# Patient Record
Sex: MALE | Race: WHITE | NOT HISPANIC OR LATINO | Employment: FULL TIME | URBAN - METROPOLITAN AREA
[De-identification: names, ages, dates, MRNs, and addresses within clinical notes are randomized per-mention and may not be internally consistent; named-entity substitution may affect disease eponyms.]

---

## 2023-04-24 ENCOUNTER — LAB (OUTPATIENT)
Dept: LAB | Facility: LAB | Age: 38
End: 2023-04-24
Payer: COMMERCIAL

## 2023-04-24 ENCOUNTER — OFFICE VISIT (OUTPATIENT)
Dept: PRIMARY CARE | Facility: CLINIC | Age: 38
End: 2023-04-24
Payer: COMMERCIAL

## 2023-04-24 VITALS
HEART RATE: 78 BPM | BODY MASS INDEX: 33.86 KG/M2 | SYSTOLIC BLOOD PRESSURE: 122 MMHG | WEIGHT: 250 LBS | DIASTOLIC BLOOD PRESSURE: 80 MMHG | HEIGHT: 72 IN | OXYGEN SATURATION: 96 %

## 2023-04-24 DIAGNOSIS — Z79.899 MEDICATION MANAGEMENT: ICD-10-CM

## 2023-04-24 DIAGNOSIS — E03.4 HYPOTHYROIDISM DUE TO ACQUIRED ATROPHY OF THYROID: ICD-10-CM

## 2023-04-24 DIAGNOSIS — M51.36 DDD (DEGENERATIVE DISC DISEASE), LUMBAR: ICD-10-CM

## 2023-04-24 DIAGNOSIS — E03.4 HYPOTHYROIDISM DUE TO ACQUIRED ATROPHY OF THYROID: Primary | ICD-10-CM

## 2023-04-24 PROBLEM — G47.33 OSA (OBSTRUCTIVE SLEEP APNEA): Status: ACTIVE | Noted: 2023-04-24

## 2023-04-24 PROBLEM — M54.2 NECK PAIN: Status: ACTIVE | Noted: 2023-04-24

## 2023-04-24 PROBLEM — M54.32 BACK PAIN WITH LEFT-SIDED SCIATICA: Status: ACTIVE | Noted: 2023-04-24

## 2023-04-24 PROBLEM — N52.9 ERECTILE DYSFUNCTION: Status: ACTIVE | Noted: 2023-04-24

## 2023-04-24 PROBLEM — M51.369 DDD (DEGENERATIVE DISC DISEASE), LUMBAR: Status: ACTIVE | Noted: 2023-04-24

## 2023-04-24 PROBLEM — D75.1 POLYCYTHEMIA: Status: ACTIVE | Noted: 2023-04-24

## 2023-04-24 PROBLEM — R93.89 ABNORMAL MRI: Status: ACTIVE | Noted: 2023-04-24

## 2023-04-24 LAB — THYROTROPIN (MIU/L) IN SER/PLAS BY DETECTION LIMIT <= 0.05 MIU/L: 3.3 MIU/L (ref 0.44–3.98)

## 2023-04-24 PROCEDURE — 83789 MASS SPECTROMETRY QUAL/QUAN: CPT

## 2023-04-24 PROCEDURE — 80307 DRUG TEST PRSMV CHEM ANLYZR: CPT

## 2023-04-24 PROCEDURE — 1036F TOBACCO NON-USER: CPT | Performed by: FAMILY MEDICINE

## 2023-04-24 PROCEDURE — 99214 OFFICE O/P EST MOD 30 MIN: CPT | Performed by: FAMILY MEDICINE

## 2023-04-24 PROCEDURE — 36415 COLL VENOUS BLD VENIPUNCTURE: CPT

## 2023-04-24 PROCEDURE — 84443 ASSAY THYROID STIM HORMONE: CPT

## 2023-04-24 RX ORDER — CARISOPRODOL 350 MG/1
350 TABLET ORAL 3 TIMES DAILY PRN
Qty: 60 TABLET | Refills: 0 | Status: SHIPPED | OUTPATIENT
Start: 2023-04-24 | End: 2023-08-03 | Stop reason: ALTCHOICE

## 2023-04-24 RX ORDER — CLOMIPHENE CITRATE 50 MG/1
TABLET ORAL
COMMUNITY
Start: 2020-12-02 | End: 2023-04-24 | Stop reason: ALTCHOICE

## 2023-04-24 RX ORDER — CHORIONIC GONADOTROPIN 10000 UNIT
KIT INTRAMUSCULAR
COMMUNITY
End: 2023-04-24 | Stop reason: ALTCHOICE

## 2023-04-24 RX ORDER — CARISOPRODOL 350 MG/1
1 TABLET ORAL 3 TIMES DAILY PRN
COMMUNITY
Start: 2018-06-22 | End: 2023-04-24 | Stop reason: SDUPTHER

## 2023-04-24 RX ORDER — LEVOTHYROXINE SODIUM 75 UG/1
1 TABLET ORAL DAILY
COMMUNITY
Start: 2021-03-04 | End: 2023-08-03 | Stop reason: ALTCHOICE

## 2023-04-24 RX ORDER — ANASTROZOLE 1 MG/1
TABLET ORAL
COMMUNITY
Start: 2020-12-02 | End: 2023-04-24 | Stop reason: ALTCHOICE

## 2023-04-24 ASSESSMENT — ENCOUNTER SYMPTOMS
UNEXPECTED WEIGHT CHANGE: 0
PALPITATIONS: 0
CONSTIPATION: 0
VOMITING: 0

## 2023-04-24 NOTE — PROGRESS NOTES
Subjective   Patient ID: Sha Melton is a 37 y.o. male who presents for Back Pain (Chronic 9 years/Ran out of thyroid medication never got the blood test done last time either ).    HPI   Thyroid well controlled.  No depression/anxiety, diarrhea/constipation, weight loss/gain, tremor, hair loss, heat/cold intolerance  Back hurts occasionally, only an issue when he is trying to sleep at night and will need a Soma rarely to be able to sleep, has lost weight and remains active  Review of Systems   Constitutional:  Negative for unexpected weight change.   HENT:  Negative for congestion and ear discharge.    Cardiovascular:  Negative for chest pain and palpitations.   Gastrointestinal:  Negative for constipation and vomiting.   All other systems reviewed and are negative.      Objective   /80   Pulse 78   Ht 1.829 m (6')   Wt 113 kg (250 lb)   SpO2 96%   BMI 33.91 kg/m²     Physical Exam  HENT:      Head: Normocephalic and atraumatic.      Nose: Nose normal.      Mouth/Throat:      Mouth: Mucous membranes are moist.      Pharynx: No oropharyngeal exudate.   Eyes:      Extraocular Movements: Extraocular movements intact.      Conjunctiva/sclera: Conjunctivae normal.      Pupils: Pupils are equal, round, and reactive to light.   Cardiovascular:      Rate and Rhythm: Normal rate and regular rhythm.   Pulmonary:      Effort: Pulmonary effort is normal.   Abdominal:      General: There is no distension.      Palpations: Abdomen is soft.   Musculoskeletal:      Cervical back: Normal range of motion and neck supple.   Lymphadenopathy:      Cervical: No cervical adenopathy.   Neurological:      General: No focal deficit present.      Mental Status: He is alert.   Psychiatric:         Attention and Perception: Attention normal.         Speech: Speech normal.         Behavior: Behavior is cooperative.     Normal gait, light touch and motor intact throughout    Assessment/Plan   Diagnoses and all orders for this  visit:  Hypothyroidism due to acquired atrophy of thyroid  Comments:  Recheck labs, compliance discussed  Orders:  -     Thyroid Stimulating Hormone; Future  DDD (degenerative disc disease), lumbar  Comments:  Home exercise regimen, stretching  Orders:  -     carisoprodol (Soma) 350 mg tablet; Take 1 tablet (350 mg) by mouth 3 times a day as needed for muscle spasms for up to 20 days.       Recheck 3 to 6 months sooner if any problems arise

## 2023-04-25 LAB
AMPHETAMINE (PRESENCE) IN URINE BY SCREEN METHOD: NORMAL
BARBITURATES PRESENCE IN URINE BY SCREEN METHOD: NORMAL
BENZODIAZEPINE (PRESENCE) IN URINE BY SCREEN METHOD: NORMAL
CANNABINOIDS IN URINE BY SCREEN METHOD: NORMAL
COCAINE (PRESENCE) IN URINE BY SCREEN METHOD: NORMAL
DRUG SCREEN COMMENT URINE: NORMAL
FENTANYL URINE: NORMAL
METHADONE (PRESENCE) IN URINE BY SCREEN METHOD: NORMAL
OPIATES (PRESENCE) IN URINE BY SCREEN METHOD: NORMAL
OXYCODONE (PRESENCE) IN URINE BY SCREEN METHOD: NORMAL
PHENCYCLIDINE (PRESENCE) IN URINE BY SCREEN METHOD: NORMAL

## 2023-04-27 ENCOUNTER — TELEPHONE (OUTPATIENT)
Dept: PRIMARY CARE | Facility: CLINIC | Age: 38
End: 2023-04-27
Payer: COMMERCIAL

## 2023-04-27 NOTE — TELEPHONE ENCOUNTER
Pt. Saw results on line. LM for him to call the office if he did feel he needed and adjustment in his medication.

## 2023-04-27 NOTE — TELEPHONE ENCOUNTER
----- Message from Antonio Richardson MD sent at 4/25/2023 11:46 AM EDT -----  Thyroid level is within normal range however the dose could be increased somewhat if you are having problems with fatigue or weight gain or cold intolerance or constipation or depression

## 2023-04-28 LAB
CARISOPRODOL, URINE: <100 NG/ML
Lab: <100 NG/ML

## 2023-07-19 ENCOUNTER — APPOINTMENT (OUTPATIENT)
Dept: LAB | Facility: LAB | Age: 38
End: 2023-07-19
Payer: COMMERCIAL

## 2023-07-19 LAB
ESTRADIOL (PG/ML) IN SER/PLAS: <19 PG/ML
FOLLITROPIN (IU/L) IN SER/PLAS: 1.3 IU/L
HEMATOCRIT (%) IN BLOOD BY AUTOMATED COUNT: 46.4 % (ref 41–52)
LUTEINIZING HORMONE (IU/ML) IN SER/PLAS: 0.8 IU/L
PROLACTIN (UG/L) IN SER/PLAS: 18 UG/L (ref 2–18)
PROSTATE SPECIFIC AG (NG/ML) IN SER/PLAS: 0.39 NG/ML (ref 0–4)
TESTOSTERONE (NG/DL) IN SER/PLAS: <60 NG/DL (ref 240–1000)

## 2023-07-22 ENCOUNTER — HOSPITAL ENCOUNTER (OUTPATIENT)
Dept: DATA CONVERSION | Facility: HOSPITAL | Age: 38
End: 2023-07-22
Attending: EMERGENCY MEDICINE

## 2023-07-26 LAB — 17-HYDROXYPROGESTERONE (REFLAB): 14.27 NG/DL

## 2023-07-27 LAB
ALANINE AMINOTRANSFERASE (SGPT) (U/L) IN SER/PLAS: 24 U/L (ref 10–52)
ALBUMIN (G/DL) IN SER/PLAS: 3.6 G/DL (ref 3.4–5)
ALKALINE PHOSPHATASE (U/L) IN SER/PLAS: 53 U/L (ref 33–120)
ANION GAP IN SER/PLAS: 10 MMOL/L (ref 10–20)
ASPARTATE AMINOTRANSFERASE (SGOT) (U/L) IN SER/PLAS: 17 U/L (ref 9–39)
BILIRUBIN TOTAL (MG/DL) IN SER/PLAS: 0.4 MG/DL (ref 0–1.2)
CALCIUM (MG/DL) IN SER/PLAS: 7.9 MG/DL (ref 8.6–10.3)
CARBON DIOXIDE, TOTAL (MMOL/L) IN SER/PLAS: 29 MMOL/L (ref 21–32)
CHLORIDE (MMOL/L) IN SER/PLAS: 103 MMOL/L (ref 98–107)
CREATININE (MG/DL) IN SER/PLAS: 0.9 MG/DL (ref 0.5–1.3)
ERYTHROCYTE DISTRIBUTION WIDTH (RATIO) BY AUTOMATED COUNT: 12.1 % (ref 11.5–14.5)
ERYTHROCYTE MEAN CORPUSCULAR HEMOGLOBIN CONCENTRATION (G/DL) BY AUTOMATED: 34.3 G/DL (ref 32–36)
ERYTHROCYTE MEAN CORPUSCULAR VOLUME (FL) BY AUTOMATED COUNT: 88 FL (ref 80–100)
ERYTHROCYTES (10*6/UL) IN BLOOD BY AUTOMATED COUNT: 4.85 X10E12/L (ref 4.5–5.9)
GFR MALE: >90 ML/MIN/1.73M2
GLUCOSE (MG/DL) IN SER/PLAS: 71 MG/DL (ref 74–99)
HEMATOCRIT (%) IN BLOOD BY AUTOMATED COUNT: 42.8 % (ref 41–52)
HEMOGLOBIN (G/DL) IN BLOOD: 14.7 G/DL (ref 13.5–17.5)
LEUKOCYTES (10*3/UL) IN BLOOD BY AUTOMATED COUNT: 4.5 X10E9/L (ref 4.4–11.3)
PLATELETS (10*3/UL) IN BLOOD AUTOMATED COUNT: 203 X10E9/L (ref 150–450)
POTASSIUM (MMOL/L) IN SER/PLAS: 4.4 MMOL/L (ref 3.5–5.3)
PROTEIN TOTAL: 6 G/DL (ref 6.4–8.2)
SODIUM (MMOL/L) IN SER/PLAS: 138 MMOL/L (ref 136–145)
UREA NITROGEN (MG/DL) IN SER/PLAS: 21 MG/DL (ref 6–23)

## 2023-08-03 ENCOUNTER — OFFICE VISIT (OUTPATIENT)
Dept: PRIMARY CARE | Facility: CLINIC | Age: 38
End: 2023-08-03
Payer: COMMERCIAL

## 2023-08-03 ENCOUNTER — APPOINTMENT (OUTPATIENT)
Dept: PRIMARY CARE | Facility: CLINIC | Age: 38
End: 2023-08-03
Payer: COMMERCIAL

## 2023-08-03 VITALS
DIASTOLIC BLOOD PRESSURE: 66 MMHG | BODY MASS INDEX: 33.91 KG/M2 | WEIGHT: 250 LBS | OXYGEN SATURATION: 94 % | SYSTOLIC BLOOD PRESSURE: 100 MMHG | HEART RATE: 81 BPM

## 2023-08-03 DIAGNOSIS — S32.009D CLOSED FRACTURE OF TRANSVERSE PROCESS OF LUMBAR VERTEBRA WITH ROUTINE HEALING, SUBSEQUENT ENCOUNTER: Primary | ICD-10-CM

## 2023-08-03 PROCEDURE — 99214 OFFICE O/P EST MOD 30 MIN: CPT | Performed by: FAMILY MEDICINE

## 2023-08-03 PROCEDURE — 1036F TOBACCO NON-USER: CPT | Performed by: FAMILY MEDICINE

## 2023-08-03 RX ORDER — ANASTROZOLE 1 MG/1
1 TABLET ORAL
COMMUNITY
Start: 2023-07-28

## 2023-08-03 RX ORDER — CLOMIPHENE CITRATE 50 MG/1
TABLET ORAL
COMMUNITY
Start: 2023-07-28

## 2023-08-03 RX ORDER — OXYCODONE HYDROCHLORIDE 5 MG/1
TABLET ORAL
COMMUNITY
Start: 2023-07-30 | End: 2023-08-03 | Stop reason: ALTCHOICE

## 2023-08-03 RX ORDER — OXYCODONE HYDROCHLORIDE 5 MG/1
10 TABLET ORAL 2 TIMES DAILY PRN
Qty: 28 TABLET | Refills: 0 | Status: SHIPPED | OUTPATIENT
Start: 2023-08-03 | End: 2023-08-10

## 2023-08-03 RX ORDER — IBUPROFEN 600 MG/1
TABLET ORAL
COMMUNITY
Start: 2023-07-21 | End: 2023-09-11 | Stop reason: ALTCHOICE

## 2023-08-03 RX ORDER — METHOCARBAMOL 500 MG/1
TABLET, FILM COATED ORAL
COMMUNITY
Start: 2023-07-30 | End: 2023-09-11 | Stop reason: ALTCHOICE

## 2023-08-03 ASSESSMENT — ENCOUNTER SYMPTOMS
CONSTIPATION: 0
UNEXPECTED WEIGHT CHANGE: 0
PALPITATIONS: 0
VOMITING: 0

## 2023-08-03 NOTE — PROGRESS NOTES
[place]  7/22-7/226 fx lumbar spine slipping on the steps and caught his back on the corner of the steps  To  Rehab 7/26-7/30  Can get around ok, but getting up and down hurts, recliner is comfortable, better than he was     Subjective   Patient ID: Sha Melton is a 37 y.o. male who presents for TCM.    HPI   c/o back pain, no numbness tingling weakness or change in bowel or bladder, no fever or chills, no blood in urine, no rash, better with rest  Nonsurgical and no brace recommended but was told that he would have to be off work 6 to 8 weeks     Review of Systems   Constitutional:  Negative for unexpected weight change.   HENT:  Negative for congestion and ear discharge.    Cardiovascular:  Negative for chest pain and palpitations.   Gastrointestinal:  Negative for constipation and vomiting.   All other systems reviewed and are negative.      Objective   /66   Pulse 81   Wt 113 kg (250 lb)   SpO2 94%   BMI 33.91 kg/m²     Physical Exam  HENT:      Head: Normocephalic and atraumatic.      Nose: Nose normal.      Mouth/Throat:      Mouth: Mucous membranes are moist.      Pharynx: No oropharyngeal exudate.   Eyes:      Extraocular Movements: Extraocular movements intact.      Conjunctiva/sclera: Conjunctivae normal.      Pupils: Pupils are equal, round, and reactive to light.   Cardiovascular:      Rate and Rhythm: Normal rate and regular rhythm.   Pulmonary:      Effort: Pulmonary effort is normal.   Abdominal:      General: There is no distension.      Palpations: Abdomen is soft.   Musculoskeletal:      Cervical back: Normal range of motion and neck supple.   Lymphadenopathy:      Cervical: No cervical adenopathy.   Neurological:      General: No focal deficit present.      Mental Status: He is alert.   Psychiatric:         Attention and Perception: Attention normal.         Speech: Speech normal.         Behavior: Behavior is cooperative.       Normal gait  Assessment/Plan   Diagnoses  and all orders for this visit:  Closed fracture of transverse process of lumbar vertebra with routine healing, subsequent encounter  -     oxyCODONE (Roxicodone) 5 mg immediate release tablet; Take 2 tablets (10 mg) by mouth 2 times a day as needed for moderate pain (4 - 6) or severe pain (7 - 10) for up to 7 days.    Continue with muscle relaxants, physical therapy, and rest  Return to work light duty given  RTC 1 month sooner if any problems arise

## 2023-09-08 PROBLEM — R21 RASH: Status: RESOLVED | Noted: 2023-09-08 | Resolved: 2023-09-08
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
## 2023-09-08 PROBLEM — N46.9 MALE INFERTILITY: Status: ACTIVE | Noted: 2023-09-08

## 2023-09-08 PROBLEM — S32.009A CLOSED FRACTURE OF LUMBAR VERTEBRA (MULTI): Status: RESOLVED | Noted: 2023-09-08 | Resolved: 2023-09-08

## 2023-09-08 PROBLEM — M54.9 DORSALGIA: Status: ACTIVE | Noted: 2023-09-08

## 2023-09-08 PROBLEM — S32.009A LUMBAR TRANSVERSE PROCESS FRACTURE (MULTI): Status: ACTIVE | Noted: 2023-09-08

## 2023-09-08 RX ORDER — TADALAFIL 5 MG/1
1 TABLET ORAL DAILY
COMMUNITY
Start: 2023-06-07

## 2023-09-08 RX ORDER — CHORIONIC GONADOTROPIN 10000 UNIT
KIT INTRAMUSCULAR
COMMUNITY
Start: 2023-08-09 | End: 2023-10-31 | Stop reason: DRUGHIGH

## 2023-09-08 RX ORDER — MELATONIN 10 MG
CAPSULE ORAL
COMMUNITY

## 2023-09-08 RX ORDER — OXYCODONE HYDROCHLORIDE 10 MG/1
TABLET ORAL
COMMUNITY
Start: 2023-07-25 | End: 2023-09-11 | Stop reason: ALTCHOICE

## 2023-09-08 RX ORDER — HYDROCODONE BITARTRATE AND ACETAMINOPHEN 5; 325 MG/1; MG/1
TABLET ORAL
COMMUNITY
Start: 2023-07-21 | End: 2023-09-11 | Stop reason: ALTCHOICE

## 2023-09-11 ENCOUNTER — OFFICE VISIT (OUTPATIENT)
Dept: PRIMARY CARE | Facility: CLINIC | Age: 38
End: 2023-09-11
Payer: COMMERCIAL

## 2023-09-11 VITALS
WEIGHT: 250 LBS | SYSTOLIC BLOOD PRESSURE: 110 MMHG | HEART RATE: 63 BPM | DIASTOLIC BLOOD PRESSURE: 76 MMHG | OXYGEN SATURATION: 95 % | BODY MASS INDEX: 33.91 KG/M2

## 2023-09-11 DIAGNOSIS — K02.9 PAIN DUE TO DENTAL CARIES: ICD-10-CM

## 2023-09-11 DIAGNOSIS — S32.009D CLOSED FRACTURE OF TRANSVERSE PROCESS OF LUMBAR VERTEBRA WITH ROUTINE HEALING, SUBSEQUENT ENCOUNTER: Primary | ICD-10-CM

## 2023-09-11 PROCEDURE — 1036F TOBACCO NON-USER: CPT | Performed by: FAMILY MEDICINE

## 2023-09-11 PROCEDURE — 99213 OFFICE O/P EST LOW 20 MIN: CPT | Performed by: FAMILY MEDICINE

## 2023-09-11 RX ORDER — AMOXICILLIN 500 MG/1
500 CAPSULE ORAL 2 TIMES DAILY
Qty: 14 CAPSULE | Refills: 0 | Status: SHIPPED | OUTPATIENT
Start: 2023-09-11 | End: 2023-09-18

## 2023-09-11 ASSESSMENT — ENCOUNTER SYMPTOMS
CONSTIPATION: 0
PALPITATIONS: 0
UNEXPECTED WEIGHT CHANGE: 0
VOMITING: 0

## 2023-09-11 NOTE — PROGRESS NOTES
Subjective   Patient ID: Sha Melton is a 37 y.o. male who presents for Letter for School/Work (Ready to go back has forms to complete/Needs note to resume medical massage /Gums are killing him and teeth are throbbing, wondering if you could treat until he sees a DDS ).    HPI   Back seems to have healed up well and he is looking forward to returning to full duty at the end of this week without any restrictions  No numbness tingling weakness no change in bowel or bladder no fever chills  Does have tooth pain which he says he thinks needs a root canal  Review of Systems   Constitutional:  Negative for unexpected weight change.   HENT:  Negative for congestion and ear discharge.    Cardiovascular:  Negative for chest pain and palpitations.   Gastrointestinal:  Negative for constipation and vomiting.   All other systems reviewed and are negative.      Objective   /76   Pulse 63   Wt 113 kg (250 lb)   SpO2 95%   BMI 33.91 kg/m²     Physical Exam  HENT:      Head: Normocephalic and atraumatic.      Nose: Nose normal.      Mouth/Throat:      Mouth: Mucous membranes are moist.      Pharynx: No oropharyngeal exudate.   Eyes:      Extraocular Movements: Extraocular movements intact.      Conjunctiva/sclera: Conjunctivae normal.      Pupils: Pupils are equal, round, and reactive to light.   Cardiovascular:      Rate and Rhythm: Normal rate and regular rhythm.   Pulmonary:      Effort: Pulmonary effort is normal.   Abdominal:      General: There is no distension.      Palpations: Abdomen is soft.   Musculoskeletal:      Cervical back: Normal range of motion and neck supple.   Lymphadenopathy:      Cervical: No cervical adenopathy.   Neurological:      General: No focal deficit present.      Mental Status: He is alert.   Psychiatric:         Attention and Perception: Attention normal.         Speech: Speech normal.         Behavior: Behavior is cooperative.         Assessment/Plan   Diagnoses and all orders for  this visit:  Closed fracture of transverse process of lumbar vertebra with routine healing, subsequent encounter  Comments:  Normal healing and cleared for return to work this Saturday no restrictions  Pain due to dental caries  Comments:  Antibiotic given and patient encouraged to see dentist and says he already has appointment  Orders:  -     amoxicillin (Amoxil) 500 mg capsule; Take 1 capsule (500 mg) by mouth 2 times a day for 7 days.    Recheck if any issues come up in the future otherwise 1 year

## 2023-10-16 ENCOUNTER — APPOINTMENT (OUTPATIENT)
Dept: ENDOCRINOLOGY | Facility: CLINIC | Age: 38
End: 2023-10-16
Payer: COMMERCIAL

## 2023-10-21 RX ORDER — LEVOTHYROXINE SODIUM 75 UG/1
75 TABLET ORAL DAILY
COMMUNITY

## 2023-10-21 RX ORDER — CARISOPRODOL 350 MG/1
350 TABLET ORAL 3 TIMES DAILY PRN
COMMUNITY

## 2023-10-24 ENCOUNTER — ANCILLARY PROCEDURE (OUTPATIENT)
Dept: ENDOCRINOLOGY | Facility: CLINIC | Age: 38
End: 2023-10-24
Payer: COMMERCIAL

## 2023-10-24 ENCOUNTER — LAB (OUTPATIENT)
Dept: LAB | Facility: LAB | Age: 38
End: 2023-10-24
Payer: COMMERCIAL

## 2023-10-24 DIAGNOSIS — N46.9 MALE INFERTILITY, UNSPECIFIED: Primary | ICD-10-CM

## 2023-10-24 DIAGNOSIS — N46.9 MALE INFERTILITY: ICD-10-CM

## 2023-10-24 LAB
% EX RESIDUAL CYTOPLASM (SEMEN): 1.3 %
% HEAD DEFECTS (SEMEN): 97 %
% NECK MIDPIECE (SEMEN): 45 %
% NORMAL (SEMEN): 1.5 % (ref 4–?)
% TAIL DEFECTS (SEMEN): 21.3 %
ABSTINENCE (DAYS): 5 DAYS (ref 2–7)
AGGLUTINATION (SEMEN): NO
ANALYZED TIME:: ABNORMAL
ANDROLOGY LAB ID#: ABNORMAL
CLUMPS (SEMEN): NO
COLLECTED COMPLETELY: YES
COLLECTION LOCATION:: ABNORMAL
COLLECTION METHOD:: ABNORMAL
CONCENTRATION(SEMEN): 12.54 MILL/ML (ref 15–?)
DEBRIS (SEMEN): YES
NON PROG. MOTILITY (SEMEN): 2 %
PROG. MOTILITY (SEMEN): 7 % (ref 32–?)
RECEIVED TIME:: ABNORMAL
SEMEN APPEARANCE: NORMAL
SEMEN LIQUEFACTION: NORMAL
SEMEN VISCOSITY: NORMAL
TOT. NO OF NORM. MOTILE SPERM (SEMEN): 0.83 MILL
TOT. NO OF NORM. SPERM (SEMEN): 0.07 MILL
TOTAL MOTILITY (SEMEN): 8 % (ref 40–?)
TOTAL NO OF MOTILE (SEMEN): 4.62 MILL
TOTAL NO OF SPERM (SEMEN): 55.17 MILL (ref 39–?)
VOLUME (SEMEN): 4.4 ML (ref 1.5–?)

## 2023-10-24 PROCEDURE — 36415 COLL VENOUS BLD VENIPUNCTURE: CPT

## 2023-10-24 PROCEDURE — 82670 ASSAY OF TOTAL ESTRADIOL: CPT

## 2023-10-24 PROCEDURE — 84403 ASSAY OF TOTAL TESTOSTERONE: CPT

## 2023-10-24 PROCEDURE — 84143 ASSAY OF 17-HYDROXYPREGNENO: CPT

## 2023-10-24 PROCEDURE — 89322 SEMEN ANAL STRICT CRITERIA: CPT | Performed by: OBSTETRICS & GYNECOLOGY

## 2023-10-24 PROCEDURE — 83001 ASSAY OF GONADOTROPIN (FSH): CPT

## 2023-10-24 PROCEDURE — 83002 ASSAY OF GONADOTROPIN (LH): CPT

## 2023-10-25 LAB
ESTRADIOL SERPL-MCNC: 26 PG/ML
FSH SERPL-ACNC: <0.9 IU/L
LH SERPL-ACNC: 0.1 IU/L
TESTOST SERPL-MCNC: 327 NG/DL (ref 240–1000)

## 2023-10-27 ENCOUNTER — TELEMEDICINE (OUTPATIENT)
Dept: UROLOGY | Facility: CLINIC | Age: 38
End: 2023-10-27
Payer: COMMERCIAL

## 2023-10-27 DIAGNOSIS — N52.9 ERECTILE DYSFUNCTION, UNSPECIFIED ERECTILE DYSFUNCTION TYPE: Primary | ICD-10-CM

## 2023-10-27 DIAGNOSIS — N46.9 INFERTILITY MALE: ICD-10-CM

## 2023-10-27 DIAGNOSIS — N46.9 MALE INFERTILITY: ICD-10-CM

## 2023-10-27 PROCEDURE — 99214 OFFICE O/P EST MOD 30 MIN: CPT | Performed by: UROLOGY

## 2023-10-27 NOTE — PROGRESS NOTES
Virtual or Telephone Consent    An interactive audio and video telecommunication system which permits real time communications between the patient (at the originating site) and provider (at the distant site) was utilized to provide this telehealth service.   Patient provided consent    Last visit 6/2023  -Advised stopping T use.   -Will likely reboot: HCG 3000 qod, Clomid 25 M-F, anastrozole 1 mg weekly.   -low T/fertility panel.   -SA.   -Restart Cialis 5 mg daily - med counselling done.     Today's visit:  Today's visit:   -had spine fracture  -has been off of everything since about summer    Partner/wife name: Mildred   Partner/wife age: 30    for: 1 year  Attempting Pregnancy for : 1 year   Previous pregnancies for either partner: Yes, son in 03/2022.   Wife's records obtained and reviewed yes, scanned in      -Currently taking anabolic steroids 250 mg weekly. Taking Anastrozole and Proviron. HCG pill form for 3 years   -Stopped taking Tadalafil.   -No breast enlargement, tenderness or pain.   Had one son last year (3/2022), [place], now interested in 2nd       PRIOR Assisted Reproductive Technology: IVF/ IUI  None       wife a nurse       Component      Latest Ref Rng 10/24/2023   [facility] Lab ID# [id number]    Abstinence (days)      2 - 7 days 5    Collected Completely Yes    Collection Location Center    Collection Method Masturbation    Received time 1:59 PM    Analyzed Time 2:19 PM    Semen Appearance Normal    Semen Viscosity Normal    Semen Liquefaction Normal    Debris (Semen) Yes    Clumps (Semen) No    Agglutination (Semen) No    Volume (Semen)      1.5 mL 4.4    Concentration(Semen)      15 mill/mL 12.54 !    Total Motility (Semen)      40 % 8 !    Prog. Motility (Semen)      32 % 7 !    Prog. Motility (Semen)      % 2    Total No of Sperm (Semen)      39 mill 55.17    Total No of Motile (Semen)      mill 4.62    % Normal (Semen)      4 % 1.5 !    % Head defects (Semen)      % 97.0     % Neck Midpiece (Semen)      % 45.0    % Tail defects (Semen)      % 21.3    % Ex Residual Cytoplasm (Semen)      % 1.3    Tot. No of Norm. Motile Sperm (Semen)      mill 0.828    Tot. No of Norm. Sperm (Semen)      mill 0.069    FOLLICLE STIMULATING HORMONE      IU/L <0.9    LH      IU/L 0.1    Estradiol      pg/mL 26    Testosterone      240 - 1,000 ng/dL 327       Legend:  ! Abnormal      PMH:  No past medical history on file.     PSH:  No past surgical history on file.     Medications:    Current Outpatient Medications:     anastrozole (Arimidex) 1 mg tablet, Take 1 tablet (1 mg total) by mouth 1 (one) time per week., Disp: , Rfl:     anastrozole (Arimidex) 1 mg tablet, TAKE ONE TABLET (1MG) BY MOUTH WEEKLY AS DIRECTED BY PROVIDER, Disp: 4 tablet, Rfl: 3    carisoprodol (Soma) 350 mg tablet, Take 1 tablet (350 mg) by mouth 3 times a day as needed., Disp: , Rfl:     chorionic gonadotropin (Pregnyl) 10,000 unit injection, Inject 3,000 units under the skin every other day as directed per provider., Disp: , Rfl:     chorionic gonadotropin (Pregnyl) 10,000 unit injection, INJECT 3,000 UNITS UNDER THE SKIN EVERY OTHER DAY AS DIRECTED PER PROVIDER., Disp: 5 each, Rfl: 3    Clomid 50 mg tablet, TAKE 1/2 TABLET BY MOUTH MONDAY THROUGH FRIDAY, Disp: , Rfl:     clomiPHENE (Clomid) 50 mg tablet, TAKE 25MG (1/2 TABLET) BY MOUTH ONCE DAILY MONDAY- FRIDAY AS DIRECTED BY PROVIDER., Disp: 10 tablet, Rfl: 5    levothyroxine (Synthroid, Levoxyl) 75 mcg tablet, Take 1 tablet (75 mcg) by mouth once daily., Disp: , Rfl:     melatonin 10 mg capsule, 1 cap(s) orally once a day (at bedtime), Disp: , Rfl:     tadalafil (Cialis) 5 mg tablet, Take 1 tablet (5 mg) by mouth once daily., Disp: , Rfl:     Allergy:  No Known Allergies     Exam  CONSTITUTIONAL:        No acute distress    HEAD:        Normocephalic and atraumatic    CHEST / RESPIRATORY      no excess work of breathing, no respiratory distress,    ABDOMEN / GASTROINTESTINAL:         Abdomen nondistended        Assessment/Plan  #infertility  -Will start reboot: HCG 1500 weekly, Clomid 25 M-F, anastrozole 1 mg weekly (will send to  specialty)       #ED  -Cialis 5 mg daily     Fu in 3 months with fert fu labs

## 2023-10-31 ENCOUNTER — PHARMACY VISIT (OUTPATIENT)
Dept: PHARMACY | Facility: CLINIC | Age: 38
End: 2023-10-31
Payer: COMMERCIAL

## 2023-10-31 ENCOUNTER — TELEMEDICINE CLINICAL SUPPORT (OUTPATIENT)
Dept: PHARMACY | Facility: HOSPITAL | Age: 38
End: 2023-10-31
Payer: COMMERCIAL

## 2023-10-31 DIAGNOSIS — N46.9 MALE INFERTILITY: Primary | ICD-10-CM

## 2023-10-31 PROCEDURE — RXMED WILLOW AMBULATORY MEDICATION CHARGE

## 2023-10-31 RX ORDER — CHORIONIC GONADOTROPIN 10000 UNIT
1500 KIT INTRAMUSCULAR
Qty: 5 EACH | Refills: 3 | Status: SHIPPED | OUTPATIENT
Start: 2023-10-31 | End: 2024-02-16

## 2023-10-31 NOTE — PROGRESS NOTES
New referral received for male fertility. Called and spoke with patient- first fill education for Pregnyl/HCG 10,000 unit vials completed. All medications in referral sent in for Dr. AUGUST per protocol. Per caregiver, receiving medications (Clomid and Anastrozole from retail pharmacy, only HCG needed from [facility]).    ---- Message -----   From: Viktoriya Barber RN   Sent: 10/27/2023   3:53 PM EDT   To: Hue Hook, PharmD   Subject: medication orders                                Patient seen by Dr. Mims on 10/27 The patient's care will be continued with the referring prescriber.     Pharmacist to order and educate the patient on the following medication: Chorionic gonadotropin (Pregnyl) 1500 iu  subcutaneous weekly disp: 5 with 3 refills. Clomid 50mg take 1/2 tablet by mouth Monday through Friday. Disp: 10 tablets 3 refills. Anastrozole 1mg by mouth once weekly. Disp: 10 with 3 refills     [facility]  Patient Management Program- First Fill Assessment:  Pregnyl / Chorionic Human Gonadotropin (HCG)     Clinical Intervention: Pregnyl /hCG First Fill Assessment/New Start Patient Education    Medication receipt date: 11/1/23 Fertility indication: Male Infertility    Planned Initiation Date: 11/1/23    Date of education: 10/31/2023  Please see details below. Patient was educated on the administration, warnings, precautions, common adverse effects, proper storage, handling, and disposal.   Patient was instructed to contact the Fertility Clinical Pharmacy Specialist or Support Liaison with any clinical or billing inquiries, as well as refill requests.   A copy of the  Specialty Pharmacy's /delivery protocol has been emailed to the patient for their reference.   We discussed all medications being used for this patient's treatment plan and reviewed patient specific goals.   Follow up needed: If Applicable    Next refill date: 30 days  Reassessment date: 3 months  The patient's  care will be continued with the referring provider.  Patient Discussion:     Introduction:   - Patient's wife contacted  via telephone  to conduct first fill assessment and new start patient education on Pregnyl /hCG.  - Verified receipt of Pregnyl /hCG from [facility], along with the pharmacy supplied Welcome Kit, sharps container, required supplies, and patient education monograph. The contents of the Welcome Kit were reviewed with the patient.      Clinical Background:  - The patient's medication list, allergies, and comorbid conditions were verified. No contraindications to therapy noted at this time. (Contraindications: hypersensitivity, tumors of the hypothalamus, pituitary, breast, prostate, and uncontrolled non-gonadal endocrinopathies- thyroid, adrenal, pituitary disorders.  - Patient advised to contact the pharmacy if there are any changes to medication list, including prescriptions, OTC medications, herbal products, or supplements.   - There are no known significant drug-drug interactions.    - Labs were reviewed and no concerns were noted regarding the patient's therapy at this time.  -Medication is clinically appropriate.      Education/Discussion:  Patient accepted the pharmacist's offer of counseling.    Indication: Pregnyl/hCG stimulates production of gonadal steroid hormones by causing production of androgen by the testes and the development of secondary sex characteristics in males. It stimulates the interstitial cells (Leydig cells) of the testis to produce androgens.     Dose:   Inject 1,500 units under the skin once weekly.    Administration:   SubQ: Reconstitute according to instructions and inject in the lower abdomen (avoiding areas within 2 inches of navel) using a 22 gauge 1½”-  use to mix HCG, 27 gauge ½”- use to inject HCG, 3 ml syringe- use to mix and inject HCG. Rotate injection sites.     Does the patient have any barriers to self-administration (including  physical and mental)? No     List barriers: n/a     Describe actions taken to mitigate barriers: n/a     Patient/caregiver counseled on proper technique for self-administration: Yes    Missed Doses:  Importance of adherence discussed with patient and they were advised to use a calendar to keep track of doses. If a dose is missed, the patient should contact  Department of Male Reproductive and Sexual Health for further dosing instructions.     What barriers to adherence does the patient have? (answer Y/N for all):   Patient has a difficult time remembering to take medications? No  Difficult administration technique? No  Medication cost? No  Patient does not think medication is beneficial? No  Other?   What actions were taken to address barriers?    Warnings, Precautions, and Adverse Reactions:   - Discussed common adverse effects, warnings and precautions pertinent to the medication including but not limited to (frequency not defined): edema, gynecomastia, headache, fatigue, irritability, and restlessness. Some patients may experience injection site reactions, such as pain, swelling, inflammation, or bruising.   - Since androgens may cause fluid retention, HCG should be used with caution in patients with cardiac, renal disease, epilepsy, migraine, or asthma.  - Patient was encouraged to reach out to their doctor's office if they develop:   -Signs of an allergic reaction   - Experienced specialists: should only be prescribed by male fertility specialists for this indication.      Handling and Storage   Store intact vials at room temperature (59°F to 86°F). Following reconstitution, solution is stable when refrigerated (36ºF to 46ºF) for 60 days (Pregnyl). Do not freeze.    Reproductive Considerations   When administered for spermatogenesis induction associated with hypogonadotropic hypogonadism in patients planning a pregnancy, the fertility status of both partners should be evaluated prior to therapy.      Disposal:  Unused,  or damaged vials should be properly disposed in sharps container.  Needles and syringes should be disposed of in sharps container once used.  Do not discard in trash.     Goals of therapy:  *Goals of therapy are patient specific   Improve sperm quality, quantity, and motility.   Improve level of hormones from male genital organs.   Ensure adequate patient education and adherence to medications.  Optimize treatment options based on patient-specific factors, including co-morbidities and past infertility complications.   Decrease risk and manage side effects from this medication.  Establish pregnancy.      Patient's Goals: Ultimate goal of achieving a viable pregnancy with their partner.    Efficacy timeline:   While immediate improvement may be noted, the patient should expect to wait 3-6 months to see full benefit from therapy.  Every person responds and reacts to therapy differently. Take as directed by your provider.  Adverse effects or efficacy to treatment can occur at any point during therapy.  Recommended contacting the  Department of Male Reproductive and Sexual Health after starting therapy and sooner if symptoms worsen or new symptoms develop.     Conclusion:  - Quality of life: did not discuss  - Pharmacy Satisfaction: did not discuss  - High risk status: No  - Is clinical intervention necessary? No  - If yes, what additional action was taken? Emailed Melina instructions, dose change from 3000 units every other day    Patient was advised of [facility]'s dispensing process, refill timeline, contact information ([phone]), and patient management follow up. Patient confirmed understanding of education conducted during assessment. All patient questions and concerns were addressed to the best of my ability. Patient was encouraged to contact the Fertility Clinical Pharmacy Specialist, Pharmacy Support Liaison, or the general line for the specialty  pharmacy if unable to reach one of the contacts for the Fertility service line (listed above) with any questions or concerns.      Human Chorionic Gonadotropin- Subcutaneous Injection  (10,000 IU Pregnyl, HCG)  Dose: 1,500 IU once weekly    Storage: Store at room temperature until reconstituted- following reconstitution, solution is stable when refrigerated.  Supplies: 3 mL syringe and 22 gauge 1½ inch needle to mix HCG, 1 mL syringe and 27 gauge ½ inch needle to inject HCG, alcohol swabs, sharps container.  Instructions:     Wash your hands and work on a clean, dry surface.    Attach a 22 gauge 1 ½ inch needle to a 3 mL syringe to mix the diluent into HCG powder.  Remove the protective caps from both vials. Clean the tops of both vials with an alcohol swab.   Pull back on the plunger to draw up 1 mL of air into the syringe.   Insert the needle into the vial of bacteriostatic water and push the 1 mL of air into the vial.   Turn the vial upside down with the needle still in the vial. Adjust the needle so that the tip of the needle is in the water, not the air space. Slowly pull back on the plunger to draw up 1 mL of bacteriostatic water.   Remove the needle from the vial of bacteriostatic water. You are only using 1 mL, so there will be a large amount left in the vial. You will not need this extra bacteriostatic water left in the vial, it can be discarded.   Inject the bacteriostatic water in the syringe into the vial of HCG powder.   Swirl the vial gently until all of the powder is dissolved.   Using the 1 mL syringe and 27 gauge ½ inch needle- you will now draw up 0.15 mL of the mixed medication in the vial for each dose (0.15 mL = 1,500 units).   Administration:   Choose an injection site on your abdomen (see image below).   Clean the area with an alcohol swab and allow to dry.   Pinch up the skin.   Insert the needle straight into the skin- do not go in at an angle.   Push the plunger all the way down, then wait  5 seconds before removing the needle from the skin.           Hue Hook (Katie), PharmD  [facility]  Clinical Pharmacy Specialist- Fertility   [facility]  [address] [place] [address]  Email: [email or web address]  Tel: [phone]       Fax: [phone]

## 2023-10-31 NOTE — PROGRESS NOTES
[facility] SPECIALTY PHARMACY PATIENT REASSESSMENT NOTE    [facility] SUPPLIED MEDICATION:      Pregnyl/HCG 10,000 unit vials    The patient's care will be continued with the referring prescriber.     TOLERANCE:   Have you experienced any side effects from this medication? No    Are there any changes to current therapy regimen? Yes changing to 1,500 weekly- new rx to follow     EFFICACY:    Have you developed any new symptoms of your condition? No    How do you feel your medication is affecting your disease state? N/a    GOALS:  Your goals of therapy are:  Ultimate goal of achieving a viable pregnancy with their partner.    COMPLIANCE / ADHERENCE:  Have you had any unplanned missed doses? No  If yes, how often do you miss doses and is there a particular contributing reason?     What barriers to adherence does the patient have? (Answer Y/N for all):  Patient has a difficult time remembering to take medications?  No  Difficult administration technique? No  Medication cost? No  Patient does not think medication is beneficial? No  Other?   What actions were taken to address barriers?     Does the patient have any barriers to self-administration (including physical and mental)? No  List barriers:   Describe actions taken to mitigate barriers:    GENERAL ASSESSMENT:  Are there any changes to your home medications, OTCs or supplements? No    Do you have any new allergies? No     Have you been diagnosed with any new medical conditions? No    PATIENT MANAGEMENT:    Do you have any questions regarding your medications, or care? No    Do you have any concerns with access to your medications? No    How would you classify your Quality of Life on a scale of 1-10? Did not discuss    How would you describe your satisfaction with  Specialty Pharmacy services on a scale of 1-10?  Did not discuss  Do you have any additional suggestions to improve  Specialty Pharmacy services:     IMPRESSION/PLAN:  Is patient high risk? No    Is  laboratory follow-up needed? No    Is a clinical intervention needed? No    Next reassessment date? 3 months    Additional comments: Completed with wife, Melina Hook (Katie), PharmEMMIE  [facility]  Clinical Pharmacy Specialist- Fertility   [facility], [facility]  [address] [place] [address]  Email: [email or web address]  Tel: [phone]       Fax: [phone]

## 2023-10-31 NOTE — PROGRESS NOTES
New patient referral for Male Fertility- Pharmacy Telehealth  Medication sent into  Specialty Pharmacy following referral from Dr. Mims        Patient is being seen by Dr. Mims for the following:      Treatment plan:   Assessment/Plan  #infertility  -Will start reboot: HCG 1500 weekly, Clomid 25 M-F, anastrozole 1 mg weekly (will send to  specialty)     The following medications were sent into  Specialty Pharmacy on 10/31/23 for the above treatment:      Pregnyl/HCG 10,000 unit vials      Hue Hook (Katie), PharmEMMIE  [facility] Special[facility] Pharm[facility]  Clinical Pharmacy Specialist- Fertility   [facility], [facility]  [address] [place] [address]  Email: [email or web address]  Tel: [phone]       Fax: [phone]

## 2023-11-01 LAB — 17OH-PREG SERPL-MCNC: 265 NG/DL

## 2024-02-16 ENCOUNTER — TELEMEDICINE CLINICAL SUPPORT (OUTPATIENT)
Dept: PHARMACY | Facility: HOSPITAL | Age: 39
End: 2024-02-16
Payer: COMMERCIAL

## 2024-02-16 NOTE — PROGRESS NOTES
Unable to reach patient - 3 attempts     Specific Medication Education: Male Fertility, on Clomid, Anastrozole, HCG 1,500 weekly     Date of outreach: 2/16/24 (final attempt), 3 attempts made and Shaser message sent for reassessment, refills, adherence check in    PRIMARY CONTACT- Wife Melina  Phone: [phone]  Email: [email or web address]    Discontinued all medications until point of contact with patient or wife. Therapy started Oct 2023 and follow up was supposed to be 3 months later with Dr. Mims.     Hue Hook (Katie), PharmEMMIE  [facility]  Clinical Pharmacy Specialist- Fertility   [facility]  [address] [place] [address]  Email: [email or web address]  Tel: [phone]       Fax: [phone]

## 2024-10-23 DIAGNOSIS — N46.9 INFERTILITY MALE: ICD-10-CM

## 2024-12-14 ENCOUNTER — APPOINTMENT (OUTPATIENT)
Dept: PRIMARY CARE | Facility: CLINIC | Age: 39
End: 2024-12-14
Payer: COMMERCIAL

## 2025-01-20 ENCOUNTER — APPOINTMENT (OUTPATIENT)
Dept: PRIMARY CARE | Facility: CLINIC | Age: 40
End: 2025-01-20
Payer: COMMERCIAL

## 2025-01-20 VITALS
BODY MASS INDEX: 35.57 KG/M2 | SYSTOLIC BLOOD PRESSURE: 104 MMHG | HEART RATE: 77 BPM | OXYGEN SATURATION: 96 % | WEIGHT: 255 LBS | DIASTOLIC BLOOD PRESSURE: 72 MMHG

## 2025-01-20 DIAGNOSIS — E03.4 HYPOTHYROIDISM DUE TO ACQUIRED ATROPHY OF THYROID: ICD-10-CM

## 2025-01-20 DIAGNOSIS — M51.361 DEGENERATION OF INTERVERTEBRAL DISC OF LUMBAR REGION WITH LOWER EXTREMITY PAIN: ICD-10-CM

## 2025-01-20 DIAGNOSIS — L72.0 EIC (EPIDERMAL INCLUSION CYST): Primary | ICD-10-CM

## 2025-01-20 DIAGNOSIS — Z79.899 MEDICATION MANAGEMENT: ICD-10-CM

## 2025-01-20 PROBLEM — S32.009A LUMBAR TRANSVERSE PROCESS FRACTURE (MULTI): Status: RESOLVED | Noted: 2023-09-08 | Resolved: 2025-01-20

## 2025-01-20 PROCEDURE — 83789 MASS SPECTROMETRY QUAL/QUAN: CPT

## 2025-01-20 PROCEDURE — 99214 OFFICE O/P EST MOD 30 MIN: CPT | Performed by: FAMILY MEDICINE

## 2025-01-20 PROCEDURE — 80307 DRUG TEST PRSMV CHEM ANLYZR: CPT

## 2025-01-20 PROCEDURE — 1036F TOBACCO NON-USER: CPT | Performed by: FAMILY MEDICINE

## 2025-01-20 RX ORDER — CARISOPRODOL 350 MG/1
350 TABLET ORAL 3 TIMES DAILY PRN
Qty: 60 TABLET | Refills: 0 | Status: SHIPPED | OUTPATIENT
Start: 2025-01-20

## 2025-01-20 ASSESSMENT — PATIENT HEALTH QUESTIONNAIRE - PHQ9
SUM OF ALL RESPONSES TO PHQ9 QUESTIONS 1 AND 2: 0
1. LITTLE INTEREST OR PLEASURE IN DOING THINGS: NOT AT ALL
2. FEELING DOWN, DEPRESSED OR HOPELESS: NOT AT ALL

## 2025-01-20 ASSESSMENT — ENCOUNTER SYMPTOMS
UNEXPECTED WEIGHT CHANGE: 0
BACK PAIN: 1
VOMITING: 0
CONSTIPATION: 0
PALPITATIONS: 0

## 2025-01-20 NOTE — PROGRESS NOTES
Subjective   Patient ID: Sha Melton is a 39 y.o. male who presents for Back Pain (Pt would like thyroid levels checked. /Chronic back pain, gets tight and mostly achy.  Pt did hurt his back 2 weeks ago getting up off the floor.  Pt has been taking ibuprofen to help).    Back Pain  Pertinent negatives include no chest pain.    c/o back pain, no numbness tingling weakness or change in bowel or bladder, no fever or chills, no blood in urine, no rash, no trauma, better with rest      Review of Systems   Constitutional:  Negative for unexpected weight change.   HENT:  Negative for congestion and ear discharge.    Cardiovascular:  Negative for chest pain and palpitations.   Gastrointestinal:  Negative for constipation and vomiting.   Musculoskeletal:  Positive for back pain.   All other systems reviewed and are negative.      Objective   /72   Pulse 77   Wt 116 kg (255 lb)   SpO2 96%   BMI 35.57 kg/m²     Physical Exam  HENT:      Head: Normocephalic and atraumatic.      Nose: Nose normal.      Mouth/Throat:      Mouth: Mucous membranes are moist.      Pharynx: No oropharyngeal exudate.   Eyes:      Extraocular Movements: Extraocular movements intact.      Conjunctiva/sclera: Conjunctivae normal.      Pupils: Pupils are equal, round, and reactive to light.   Cardiovascular:      Rate and Rhythm: Normal rate and regular rhythm.   Pulmonary:      Effort: Pulmonary effort is normal.      Breath sounds: Normal breath sounds.   Abdominal:      General: There is no distension.      Palpations: Abdomen is soft.   Musculoskeletal:      Cervical back: Normal range of motion and neck supple.   Lymphadenopathy:      Cervical: No cervical adenopathy.   Neurological:      General: No focal deficit present.      Mental Status: He is alert.   Psychiatric:         Attention and Perception: Attention normal.         Speech: Speech normal.         Behavior: Behavior is cooperative.     Normal gait  Neuro sensorimotor  intact    Assessment/Plan   Problem List Items Addressed This Visit             ICD-10-CM    DDD (degenerative disc disease), lumbar M51.369     Risk-benefit discussed and refill medication as directed  I have personally reviewed the OARRS report for this patient. I have considered the risks of abuse, dependence, addiction and diversion.           Relevant Medications    carisoprodol (Soma) 350 mg tablet    Hypothyroidism due to acquired atrophy of thyroid E03.4     Monitor  Patient has come off of his thyroid medication         Relevant Orders    Thyroid Stimulating Hormone    Thyroxine, Free     Other Visit Diagnoses         Codes    EIC (epidermal inclusion cyst)    -  Primary L72.0    Relevant Orders    Referral to General Surgery    Medication management     Z79.899    Relevant Orders    Meprobamate Urine Confirmation    Drug Screen, Urine With Reflex to Confirmation         LM discussed  Reviewed labs  Recheck 6 to 12 months sooner if any issues arise

## 2025-01-20 NOTE — ASSESSMENT & PLAN NOTE
Monitor  Patient has come off of his thyroid medication  
Risk-benefit discussed and refill medication as directed  I have personally reviewed the OARRS report for this patient. I have considered the risks of abuse, dependence, addiction and diversion.    
diminished breath sounds, R

## 2025-01-21 LAB
AMPHETAMINES UR QL SCN: NORMAL
BARBITURATES UR QL SCN: NORMAL
BENZODIAZ UR QL SCN: NORMAL
BZE UR QL SCN: NORMAL
CANNABINOIDS UR QL SCN: NORMAL
FENTANYL+NORFENTANYL UR QL SCN: NORMAL
METHADONE UR QL SCN: NORMAL
OPIATES UR QL SCN: NORMAL
OXYCODONE+OXYMORPHONE UR QL SCN: NORMAL
PCP UR QL SCN: NORMAL

## 2025-01-23 LAB
CARISOPRODOL UR-MCNC: <100 NG/ML
MEPROBAMATE UR-MCNC: <100 NG/ML

## 2025-02-26 ENCOUNTER — APPOINTMENT (OUTPATIENT)
Dept: SURGERY | Facility: CLINIC | Age: 40
End: 2025-02-26
Payer: COMMERCIAL

## 2025-02-26 VITALS
OXYGEN SATURATION: 95 % | SYSTOLIC BLOOD PRESSURE: 134 MMHG | BODY MASS INDEX: 34.54 KG/M2 | DIASTOLIC BLOOD PRESSURE: 91 MMHG | WEIGHT: 255 LBS | HEIGHT: 72 IN | HEART RATE: 94 BPM

## 2025-02-26 DIAGNOSIS — L72.0 EIC (EPIDERMAL INCLUSION CYST): ICD-10-CM

## 2025-02-26 PROCEDURE — 99203 OFFICE O/P NEW LOW 30 MIN: CPT | Performed by: SURGERY

## 2025-02-26 PROCEDURE — 3008F BODY MASS INDEX DOCD: CPT | Performed by: SURGERY

## 2025-02-26 NOTE — H&P (VIEW-ONLY)
General Surgery History and Physical    Referring Provider:  MD Debra Lozano, Antonio TREVINO MD     Chief Complaint:  Scalp sebaceous cyst    History of Present Illness:  This is a 39 y.o. male who presents with 3 sebaceous cysts on his scalp.  He reports the largest 1 has been present for about 10 years.  However, over the last 2 years it is grown significantly in size.  He denies ever having any drainage or signs of infection from it.  However, it is now grown to the size that it is symptomatic.  He needs to wear a hat for work, and the cyst significantly interferes with the comfort of wearing a hat.  He also notices that anytime he puts any pressure on his upper scalp, it is uncomfortable.  In the intervening time, he is discovered to more smaller cysts inferior to the larger cyst at the vertex of his scalp.    Past Medical History:  Patient denies any    Past Surgical History:  Patient denies any    Medications:  Soma    Allergies:  No known drug allergies    Family History:  Heart disease  Diabetes    Social History:  .  Works as a .  Denies tobacco and illicits.  Drinks 2 alcoholic beverages a week.       Review of Systems:  A complete 12 point review of systems was performed and is negative except as noted in the history of present illness.      Vital Signs:  Vitals:    02/26/25 0850   BP: (!) 134/91   Pulse: 94   SpO2: 95%          Physical Exam:  General: No acute distress. Sitting up in bed.   Neuro: Alert and oriented ×3. Follows commands.  Head: Atraumatic.  There is an approximately 3 cm diameter cyst at the vertex of the scalp.  Posterior to this, near the right occipital scalp, there are 2 smaller cysts, each about 1.2 cm in diameter.  Eyes: Pupils equal reactive to light. Extraocular motions intact.  Ears: Hears normal speaking voice.  Bilateral cauliflower ear.  Mouth, Nose, Throat: Mucous membranes moist.  Normal dentition.  Neck: Supple. No appreciable masses.  Chest: No  crepitus.  No appreciable scars.  Heart: Regular rate and rhythm.  Lung: Clear to auscultation bilaterally.  Vascular: No carotid bruits.  Palpable radial pulses bilaterally.  Abdomen: Soft. Nondistended. Nontender.  No appreciable hernias or scars.  Musculoskeletal: Moves all extremities.  Normal range of motion.  Lymphatic: No palpable lymph nodes.  Skin: No rashes or lesions.  Psychological: Normal affect      Laboratory Values:  None      Imaging:    None      Assessment:  This is a 39 y.o. male who presents with a large symptomatic sebaceous cyst on the vertex of his scalp.  He has 2 smaller less symptomatic cysts over the occipital scalp on the right side.  We discussed that since they are symptomatic, I do recommend surgical excision.      Plan:  -- Surgical excision of the posterior scalp cysts under MAC sedation      Torin Martinez MD  General Surgery  Office: [phone]  Fax:     [phone]  9:01 AM   02/26/25

## 2025-02-27 DIAGNOSIS — D23.4 DERMOID CYST OF SCALP: Primary | ICD-10-CM

## 2025-02-27 DIAGNOSIS — Z01.818 PRE-OPERATIVE EXAM: ICD-10-CM

## 2025-03-06 ENCOUNTER — ANESTHESIA EVENT (OUTPATIENT)
Dept: OPERATING ROOM | Facility: HOSPITAL | Age: 40
End: 2025-03-06
Payer: COMMERCIAL

## 2025-03-06 RX ORDER — ALBUTEROL SULFATE 0.83 MG/ML
2.5 SOLUTION RESPIRATORY (INHALATION) ONCE AS NEEDED
Status: CANCELLED | OUTPATIENT
Start: 2025-03-06

## 2025-03-06 RX ORDER — MEPERIDINE HYDROCHLORIDE 25 MG/ML
12.5 INJECTION INTRAMUSCULAR; INTRAVENOUS; SUBCUTANEOUS EVERY 10 MIN PRN
Status: CANCELLED | OUTPATIENT
Start: 2025-03-06

## 2025-03-06 RX ORDER — DIPHENHYDRAMINE HYDROCHLORIDE 50 MG/ML
12.5 INJECTION, SOLUTION INTRAMUSCULAR; INTRAVENOUS ONCE AS NEEDED
Status: CANCELLED | OUTPATIENT
Start: 2025-03-06

## 2025-03-06 RX ORDER — DROPERIDOL 2.5 MG/ML
0.62 INJECTION, SOLUTION INTRAMUSCULAR; INTRAVENOUS ONCE AS NEEDED
Status: CANCELLED | OUTPATIENT
Start: 2025-03-06

## 2025-03-06 RX ORDER — OXYCODONE HYDROCHLORIDE 5 MG/1
5 TABLET ORAL EVERY 4 HOURS PRN
Status: CANCELLED | OUTPATIENT
Start: 2025-03-06

## 2025-03-06 RX ORDER — SODIUM CHLORIDE, SODIUM LACTATE, POTASSIUM CHLORIDE, CALCIUM CHLORIDE 600; 310; 30; 20 MG/100ML; MG/100ML; MG/100ML; MG/100ML
100 INJECTION, SOLUTION INTRAVENOUS CONTINUOUS
Status: CANCELLED | OUTPATIENT
Start: 2025-03-06 | End: 2025-03-07

## 2025-03-06 RX ORDER — ONDANSETRON HYDROCHLORIDE 2 MG/ML
4 INJECTION, SOLUTION INTRAVENOUS ONCE AS NEEDED
Status: CANCELLED | OUTPATIENT
Start: 2025-03-06

## 2025-03-07 ENCOUNTER — TELEPHONE (OUTPATIENT)
Dept: PREADMISSION TESTING | Facility: HOSPITAL | Age: 40
End: 2025-03-07
Payer: COMMERCIAL

## 2025-03-07 NOTE — TELEPHONE ENCOUNTER
Pre-procedure PAT phone assessment completed. Pre-operative and medication instructions reviewed with patient. Patient verbalizes understanding of instructions.  SURGERY PRE-OPERATIVE INSTRUCTIONS    *You will receive a phone call the day before your procedure  after 2pm, (or the Friday before your surgery if scheduled on a Monday.) Generally the hospital will be calling you with this information after that time.    *You are not to eat after midnight the night before the surgery. You may have up to 13 ounces of clear liquids up until 2 hours prior to arriving to the hospital. The exception is with medications you were instructed to take day of surgery.    *You may take tylenol for pain/discomfort as needed.     *Stop taking all aspirin products, ibuprofen (motrin/advil), naproxen (aleve/naprosyn) for one week prior to surgery.    *Stop taking all vitamins and supplements one week prior to surgery.     *You should not have alcoholic beverages for 24 hours before surgery.     *You should not smoke 24 hours prior to surgery.     *To help prevent surgical infections bathe/shower with Dial soap the evening before surgery.    *You can wear deodorant but no lotion, powder, or perfume/cologne. You should remove all make-up and nail polish at home.    *If you wear glasses, please bring a case for the glasses with you.    *You will be asked to remove dentures and contacts.     *Please leave all valuables at home.    *You should wear loose, comfortable clothing that will accommodate bandages and/or casts.    *You should notify your doctor of any change in your condition (fever, cold, rash, etc). Surgery may need to be re-scheduled until a time you are in better health.    *A responsible adult is required to accompany you to and from the hospital if you are receiving anesthesia or a sedative. Patients are not permitted to drive for 24 hours after anesthesia.     *You can use the [facility] parking if you wish.     *If you have any  further questions please call [facility] [phone].

## 2025-03-09 LAB
ALBUMIN SERPL-MCNC: 4.5 G/DL (ref 3.6–5.1)
ALP SERPL-CCNC: 52 U/L (ref 36–130)
ALT SERPL-CCNC: 36 U/L (ref 9–46)
ANION GAP SERPL CALCULATED.4IONS-SCNC: 8 MMOL/L (CALC) (ref 7–17)
AST SERPL-CCNC: 23 U/L (ref 10–40)
BILIRUB SERPL-MCNC: 0.7 MG/DL (ref 0.2–1.2)
BUN SERPL-MCNC: 14 MG/DL (ref 7–25)
CALCIUM SERPL-MCNC: 9.4 MG/DL (ref 8.6–10.3)
CHLORIDE SERPL-SCNC: 103 MMOL/L (ref 98–110)
CO2 SERPL-SCNC: 29 MMOL/L (ref 20–32)
CREAT SERPL-MCNC: 1.04 MG/DL (ref 0.6–1.26)
EGFRCR SERPLBLD CKD-EPI 2021: 95 ML/MIN/1.73M2
ERYTHROCYTE [DISTWIDTH] IN BLOOD BY AUTOMATED COUNT: 12.6 % (ref 11–15)
GLUCOSE SERPL-MCNC: 94 MG/DL (ref 65–99)
HCT VFR BLD AUTO: 50.6 % (ref 38.5–50)
HGB BLD-MCNC: 17 G/DL (ref 13.2–17.1)
MCH RBC QN AUTO: 31.1 PG (ref 27–33)
MCHC RBC AUTO-ENTMCNC: 33.6 G/DL (ref 32–36)
MCV RBC AUTO: 92.5 FL (ref 80–100)
PLATELET # BLD AUTO: 252 THOUSAND/UL (ref 140–400)
PMV BLD REES-ECKER: 9.3 FL (ref 7.5–12.5)
POTASSIUM SERPL-SCNC: 3.8 MMOL/L (ref 3.5–5.3)
PROT SERPL-MCNC: 7.6 G/DL (ref 6.1–8.1)
RBC # BLD AUTO: 5.47 MILLION/UL (ref 4.2–5.8)
SODIUM SERPL-SCNC: 140 MMOL/L (ref 135–146)
T4 FREE SERPL-MCNC: 1.3 NG/DL (ref 0.8–1.8)
TSH SERPL-ACNC: 9.69 MIU/L (ref 0.4–4.5)
WBC # BLD AUTO: 6.5 THOUSAND/UL (ref 3.8–10.8)

## 2025-03-11 ENCOUNTER — HOSPITAL ENCOUNTER (OUTPATIENT)
Facility: HOSPITAL | Age: 40
Setting detail: OUTPATIENT SURGERY
Discharge: HOME | End: 2025-03-11
Attending: SURGERY | Admitting: SURGERY
Payer: COMMERCIAL

## 2025-03-11 ENCOUNTER — ANESTHESIA (OUTPATIENT)
Dept: OPERATING ROOM | Facility: HOSPITAL | Age: 40
End: 2025-03-11
Payer: COMMERCIAL

## 2025-03-11 VITALS
RESPIRATION RATE: 15 BRPM | WEIGHT: 260.25 LBS | HEIGHT: 72 IN | OXYGEN SATURATION: 98 % | HEART RATE: 69 BPM | SYSTOLIC BLOOD PRESSURE: 121 MMHG | DIASTOLIC BLOOD PRESSURE: 67 MMHG | BODY MASS INDEX: 35.25 KG/M2 | TEMPERATURE: 97.5 F

## 2025-03-11 DIAGNOSIS — D23.4 DERMOID CYST OF SCALP: Primary | ICD-10-CM

## 2025-03-11 PROBLEM — K21.9 GASTROESOPHAGEAL REFLUX DISEASE: Status: ACTIVE | Noted: 2025-03-11

## 2025-03-11 PROCEDURE — 3700000002 HC GENERAL ANESTHESIA TIME - EACH INCREMENTAL 1 MINUTE: Performed by: SURGERY

## 2025-03-11 PROCEDURE — A11424 PR EXC SKIN BENIG 3.1-4 CM REMAINDR BODY: Performed by: ANESTHESIOLOGY

## 2025-03-11 PROCEDURE — 2500000004 HC RX 250 GENERAL PHARMACY W/ HCPCS (ALT 636 FOR OP/ED): Performed by: SURGERY

## 2025-03-11 PROCEDURE — 88304 TISSUE EXAM BY PATHOLOGIST: CPT | Performed by: PATHOLOGY

## 2025-03-11 PROCEDURE — 2500000004 HC RX 250 GENERAL PHARMACY W/ HCPCS (ALT 636 FOR OP/ED): Performed by: ANESTHESIOLOGY

## 2025-03-11 PROCEDURE — 11401 EXC TR-EXT B9+MARG 0.6-1 CM: CPT | Performed by: SURGERY

## 2025-03-11 PROCEDURE — 96372 THER/PROPH/DIAG INJ SC/IM: CPT | Performed by: SURGERY

## 2025-03-11 PROCEDURE — 3600000008 HC OR TIME - EACH INCREMENTAL 1 MINUTE - PROCEDURE LEVEL THREE: Performed by: SURGERY

## 2025-03-11 PROCEDURE — 3600000003 HC OR TIME - INITIAL BASE CHARGE - PROCEDURE LEVEL THREE: Performed by: SURGERY

## 2025-03-11 PROCEDURE — A11424 PR EXC SKIN BENIG 3.1-4 CM REMAINDR BODY: Performed by: NURSE ANESTHETIST, CERTIFIED REGISTERED

## 2025-03-11 PROCEDURE — 2720000007 HC OR 272 NO HCPCS: Performed by: SURGERY

## 2025-03-11 PROCEDURE — 12032 INTMD RPR S/A/T/EXT 2.6-7.5: CPT | Performed by: SURGERY

## 2025-03-11 PROCEDURE — 7100000009 HC PHASE TWO TIME - INITIAL BASE CHARGE: Performed by: SURGERY

## 2025-03-11 PROCEDURE — 2500000001 HC RX 250 WO HCPCS SELF ADMINISTERED DRUGS (ALT 637 FOR MEDICARE OP): Performed by: SURGERY

## 2025-03-11 PROCEDURE — 2500000004 HC RX 250 GENERAL PHARMACY W/ HCPCS (ALT 636 FOR OP/ED)

## 2025-03-11 PROCEDURE — 7100000010 HC PHASE TWO TIME - EACH INCREMENTAL 1 MINUTE: Performed by: SURGERY

## 2025-03-11 PROCEDURE — 11403 EXC TR-EXT B9+MARG 2.1-3CM: CPT | Performed by: SURGERY

## 2025-03-11 PROCEDURE — 3700000001 HC GENERAL ANESTHESIA TIME - INITIAL BASE CHARGE: Performed by: SURGERY

## 2025-03-11 PROCEDURE — 0752T DGTZ GLS MCRSCP SLD LVL III: CPT | Mod: TC,GEALAB | Performed by: SURGERY

## 2025-03-11 RX ORDER — TRAMADOL HYDROCHLORIDE 50 MG/1
50 TABLET ORAL EVERY 6 HOURS PRN
Qty: 5 TABLET | Refills: 0 | Status: SHIPPED | OUTPATIENT
Start: 2025-03-11

## 2025-03-11 RX ORDER — CEFAZOLIN 1 G/1
INJECTION, POWDER, FOR SOLUTION INTRAVENOUS AS NEEDED
Status: DISCONTINUED | OUTPATIENT
Start: 2025-03-11 | End: 2025-03-11

## 2025-03-11 RX ORDER — ONDANSETRON 4 MG/1
4 TABLET, ORALLY DISINTEGRATING ORAL EVERY 6 HOURS PRN
Qty: 15 TABLET | Refills: 0 | Status: SHIPPED | OUTPATIENT
Start: 2025-03-11

## 2025-03-11 RX ORDER — FENTANYL CITRATE 50 UG/ML
INJECTION, SOLUTION INTRAMUSCULAR; INTRAVENOUS AS NEEDED
Status: DISCONTINUED | OUTPATIENT
Start: 2025-03-11 | End: 2025-03-11

## 2025-03-11 RX ORDER — ACETAMINOPHEN 325 MG/1
650 TABLET ORAL EVERY 6 HOURS
Qty: 20 TABLET | Refills: 0 | Status: SHIPPED | OUTPATIENT
Start: 2025-03-11 | End: 2025-03-14

## 2025-03-11 RX ORDER — MIDAZOLAM HYDROCHLORIDE 1 MG/ML
INJECTION INTRAMUSCULAR; INTRAVENOUS AS NEEDED
Status: DISCONTINUED | OUTPATIENT
Start: 2025-03-11 | End: 2025-03-11

## 2025-03-11 RX ORDER — ONDANSETRON HYDROCHLORIDE 2 MG/ML
INJECTION, SOLUTION INTRAVENOUS AS NEEDED
Status: DISCONTINUED | OUTPATIENT
Start: 2025-03-11 | End: 2025-03-11

## 2025-03-11 RX ORDER — PROPOFOL 10 MG/ML
INJECTION, EMULSION INTRAVENOUS AS NEEDED
Status: DISCONTINUED | OUTPATIENT
Start: 2025-03-11 | End: 2025-03-11

## 2025-03-11 RX ORDER — DEXMEDETOMIDINE IN 0.9 % NACL 20 MCG/5ML
SYRINGE (ML) INTRAVENOUS AS NEEDED
Status: DISCONTINUED | OUTPATIENT
Start: 2025-03-11 | End: 2025-03-11

## 2025-03-11 RX ORDER — IBUPROFEN 600 MG/1
600 TABLET ORAL EVERY 6 HOURS
Qty: 10 TABLET | Refills: 0 | Status: SHIPPED | OUTPATIENT
Start: 2025-03-11 | End: 2025-03-14

## 2025-03-11 RX ORDER — GABAPENTIN 300 MG/1
300 CAPSULE ORAL ONCE
Status: COMPLETED | OUTPATIENT
Start: 2025-03-11 | End: 2025-03-11

## 2025-03-11 RX ORDER — POLYETHYLENE GLYCOL 3350 17 G/17G
17 POWDER, FOR SOLUTION ORAL DAILY
Qty: 170 G | Refills: 0 | Status: SHIPPED | OUTPATIENT
Start: 2025-03-11 | End: 2025-03-21

## 2025-03-11 RX ORDER — BUPIVACAINE HYDROCHLORIDE 5 MG/ML
INJECTION, SOLUTION PERINEURAL AS NEEDED
Status: DISCONTINUED | OUTPATIENT
Start: 2025-03-11 | End: 2025-03-11 | Stop reason: HOSPADM

## 2025-03-11 RX ORDER — LIDOCAINE HYDROCHLORIDE 10 MG/ML
INJECTION, SOLUTION INFILTRATION; PERINEURAL AS NEEDED
Status: DISCONTINUED | OUTPATIENT
Start: 2025-03-11 | End: 2025-03-11 | Stop reason: HOSPADM

## 2025-03-11 RX ORDER — ENOXAPARIN SODIUM 100 MG/ML
30 INJECTION SUBCUTANEOUS ONCE
Status: COMPLETED | OUTPATIENT
Start: 2025-03-11 | End: 2025-03-11

## 2025-03-11 RX ORDER — SODIUM CHLORIDE, SODIUM LACTATE, POTASSIUM CHLORIDE, CALCIUM CHLORIDE 600; 310; 30; 20 MG/100ML; MG/100ML; MG/100ML; MG/100ML
20 INJECTION, SOLUTION INTRAVENOUS CONTINUOUS
Status: DISCONTINUED | OUTPATIENT
Start: 2025-03-11 | End: 2025-03-11 | Stop reason: HOSPADM

## 2025-03-11 RX ORDER — ACETAMINOPHEN 325 MG/1
650 TABLET ORAL ONCE
Status: COMPLETED | OUTPATIENT
Start: 2025-03-11 | End: 2025-03-11

## 2025-03-11 RX ADMIN — ONDANSETRON 4 MG: 2 INJECTION, SOLUTION INTRAMUSCULAR; INTRAVENOUS at 08:34

## 2025-03-11 RX ADMIN — SODIUM CHLORIDE, POTASSIUM CHLORIDE, SODIUM LACTATE AND CALCIUM CHLORIDE: 600; 310; 30; 20 INJECTION, SOLUTION INTRAVENOUS at 08:00

## 2025-03-11 RX ADMIN — PROPOFOL 100 MG: 10 INJECTION, EMULSION INTRAVENOUS at 08:10

## 2025-03-11 RX ADMIN — Medication 8 MCG: at 08:10

## 2025-03-11 RX ADMIN — FENTANYL CITRATE 50 MCG: 50 INJECTION, SOLUTION INTRAMUSCULAR; INTRAVENOUS at 08:04

## 2025-03-11 RX ADMIN — ENOXAPARIN SODIUM 30 MG: 30 INJECTION SUBCUTANEOUS at 07:56

## 2025-03-11 RX ADMIN — Medication 4 MCG: at 08:13

## 2025-03-11 RX ADMIN — Medication 8 MCG: at 08:20

## 2025-03-11 RX ADMIN — MIDAZOLAM HYDROCHLORIDE 2 MG: 1 INJECTION, SOLUTION INTRAMUSCULAR; INTRAVENOUS at 08:00

## 2025-03-11 RX ADMIN — ACETAMINOPHEN 650 MG: 325 TABLET ORAL at 07:28

## 2025-03-11 RX ADMIN — GABAPENTIN 300 MG: 300 CAPSULE ORAL at 07:30

## 2025-03-11 RX ADMIN — PROPOFOL 200 MCG/KG/MIN: 10 INJECTION, EMULSION INTRAVENOUS at 08:11

## 2025-03-11 RX ADMIN — CEFAZOLIN 2 G: 1 INJECTION, POWDER, FOR SOLUTION INTRAMUSCULAR; INTRAVENOUS at 08:11

## 2025-03-11 RX ADMIN — SODIUM CHLORIDE, SODIUM LACTATE, POTASSIUM CHLORIDE, AND CALCIUM CHLORIDE 20 ML/HR: .6; .31; .03; .02 INJECTION, SOLUTION INTRAVENOUS at 06:57

## 2025-03-11 SDOH — HEALTH STABILITY: MENTAL HEALTH: CURRENT SMOKER: 0

## 2025-03-11 ASSESSMENT — PAIN SCALES - GENERAL
PAINLEVEL_OUTOF10: 0 - NO PAIN

## 2025-03-11 ASSESSMENT — PAIN - FUNCTIONAL ASSESSMENT: PAIN_FUNCTIONAL_ASSESSMENT: 0-10

## 2025-03-11 NOTE — OP NOTE
SURGICAL EXCISION OF THE POSTERIOR SCALP CYSTS     Operative Date: 03/11/25  Patient's Name: Sha Melton  Patient's YOB: 1985  Patient's MRN: [id number]  Patient's Age: 39 y.o.  Operating Room Location: [facility]  Patient's ASA: III  Patient's Estimated Blood Loss: 10 mL      PREOPERATIVE DIAGNOSIS:   Sebaceous cyst of scalp x 3        POSTOPERATIVE DIAGNOSIS:   Sebaceous cyst of scalp x 3        OPERATION/PROCEDURE:   1. Excision of sebaceous cyst from the vertex of the scalp, 3 cm.   2. Layered closure of skin and soft tissue of the vertex of the scalp, 3.5 cm.   3. Excision of sebaceous cyst from the posterior scalp, 1 cm.   4. Layered closure of skin and soft tissue of the posterior scalp, 1.5 cm.   5. Excision of sebaceous cyst from the posterior scalp, 1 cm.   6. Layered closure of skin and soft tissue of the posterior scalp, 1.5 cm.      SURGEON:   Manohar Martinez MD.       ASSISTANT:   WILFRIDO Samuel    No surgical resident was available to assist.  No hospital-employed assistant was available to assist.  The PA assisted with opening, exposure, dissection, and closure.             INDICATIONS:   This is a 39 y.o. male who presented with 3 symptomatic cysts from his scalp.  He had 1 very large cyst near the vertex of his scalp, just posterior to it.  It measured about 3 cm in diameter.  It been present for many years, but recently had become quite symptomatic.  Any pressure on it would hurt.  He also noted 2 smaller cysts more posterior in his scalp, 1 over the occipital scalp, and 1 posterior to that near where the occiput met the neck.  These were less symptomatic, but remained mildly symptomatic.        OPERATION/PROCEDURE:   The reasons for, benefits to, and risks of surgery were discussed with the patient.  The risks include, but are not limited to, bleeding, infection, and recurrence.  The patient appeared to understand and consented for surgery.   He was therefore brought back to the operating room and placed on the operating room table in the left lateral decubitus position.  His scalp was prepped and draped in a standard fashion.       We started at the vertex with a 3.5 x 0.6 cm elliptical incision around the edges of the cyst.  After cutting through the skin, we quickly came to the cyst wall.  We then carefully sharply dissected between the cyst wall and the subcutaneous tissue.  We then switched to electrocautery and began dissecting around the cyst wall circumferentially.  We were able to elevate it and cut through healthy subcutaneous fat.  We then eventually completely excised the mass.  It measured 3 cm in diameter.  It was consistent with a sebaceous cyst.  We then confirmed hemostasis.  We closed the deeper tissues with interrupted 3-0 Vicryl sutures.  We then closed the skin with subcuticular 4-0 Vicryl suture.  Dermabond was applied over top.  The incision measured 3.5 cm in length.    We moved to the right occipital scalp with a 1.5 cm elliptical incision around the edges of the cyst.  After cutting through the skin, we quickly came to the cyst wall.  We then carefully sharply dissected between the cyst wall and the subcutaneous tissue.  We then switched to electrocautery and began dissecting around the cyst wall circumferentially.  We were able to elevate it and cut through healthy subcutaneous fat.  We then eventually completely excised the mass.  It measured 1 cm in diameter.  It was consistent with a sebaceous cyst.  We then confirmed hemostasis.  We closed the deeper tissues with interrupted 3-0 Vicryl sutures.  We then closed the skin with subcuticular 4-0 Vicryl suture.  Dermabond was applied over top.  The incision measured 1.5 cm in length.    We finally moved to the lower occipital scalp/upper neck with a 1.5 cm elliptical incision around the edges of the cyst.  After cutting through the skin, we quickly came to the cyst wall.  We then  carefully sharply dissected between the cyst wall and the subcutaneous tissue.  We then switched to electrocautery and began dissecting around the cyst wall circumferentially.  We were able to elevate it and cut through healthy subcutaneous fat.  We then eventually completely excised the mass.  It measured 1 cm in diameter.  It was consistent with a sebaceous cyst.  We then confirmed hemostasis.  We closed the deeper tissues with interrupted 3-0 Vicryl sutures.  We then closed the skin with subcuticular 4-0 Vicryl suture.  Dermabond was applied over top.  The incision measured 1.5 cm in length.        DISPOSITION:   The patient tolerated the procedure well.  There were no immediate complications.  He will be brought to the postanesthesia care unit. From there, he will be discharged home with outpatient followup with me.      I was present and scrubbed in for the entire procedure except for closure of the epidermis.      CPT Codes:  32186, 92510   23592, 19364  87438, 30993      Operating Room Staff:  Anesthesiologist: Jorje Duncan DO  CRNA: JOCY Dsouza-LASHELL  SRNA: Antonio Anaya  Circulator: Carolina Small RN; Fabiola Mckeon RN  Scrub Person: Reyes Martinez MD  General Surgery  Office: [phone]  Fax:     [phone]  8:57 AM  03/11/25

## 2025-03-11 NOTE — ANESTHESIA POSTPROCEDURE EVALUATION
Patient: Sha Melton    Procedure Summary       Date: 03/11/25 Room / Location: [facility] OR 07 / Virtual [facility] OR    Anesthesia Start: 0800 Anesthesia Stop: 0900    Procedure: SURGICAL EXCISION OF THE POSTERIOR SCALP CYSTS (Head) Diagnosis:       Dermoid cyst of scalp      (Dermoid cyst of scalp [D23.4])    Surgeons: Manoahr Martinez MD Responsible Provider: Jorje Duncan DO    Anesthesia Type: MAC ASA Status: 3            Anesthesia Type: MAC    Vitals Value Taken Time   /67 03/11/25 0929   Temp 36.4 °C (97.5 °F) 03/11/25 0859   Pulse 69 03/11/25 0929   Resp 15 03/11/25 0929   SpO2 98 % 03/11/25 0929       Anesthesia Post Evaluation    Patient location during evaluation: PACU  Patient participation: complete - patient participated  Level of consciousness: awake and alert  Pain management: adequate  Airway patency: patent  Cardiovascular status: acceptable and blood pressure returned to baseline  Respiratory status: acceptable  Hydration status: acceptable  Postoperative Nausea and Vomiting: none        No notable events documented.

## 2025-03-11 NOTE — ANESTHESIA PREPROCEDURE EVALUATION
Patient: Sha Melton    Procedure Information       Date/Time: 03/11/25 0745    Procedure: SURGICAL EXCISION OF THE POSTERIOR SCALP CYSTS (Head) - Surgical excision of the posterior scalp cysts under MAC sedation    Location: [facility] OR 07 / [facility]    Surgeons: Manohar Martinez MD            Relevant Problems   Anesthesia (within normal limits)      Pulmonary   (+) MASSIEL (obstructive sleep apnea) (No CPAP)      GI   (+) Gastroesophageal reflux disease      /Renal (within normal limits)      Endocrine   (+) Hypothyroidism due to acquired atrophy of thyroid      Hematology   (+) Polycythemia      Musculoskeletal   (+) DDD (degenerative disc disease), lumbar     Vitals:    03/11/25 0636   BP: 137/82   Pulse: 85   Resp: 16   Temp: 36.2 °C (97.2 °F)   SpO2: 100%       Past Surgical History:   Procedure Laterality Date    INCISION AND DRAINAGE INTRA ORAL ABSCESS  05/2010    PERITONSILLAR    LUMBAR EPIDURAL INJECTION  02/03/2021    DR LAURO GIORDANO    SHOULDER SURGERY      WISDOM TOOTH EXTRACTION       Past Medical History:   Diagnosis Date    Back pain with left-sided sciatica     Class 1 obesity with body mass index (BMI) of 34.0 to 34.9 in adult 02/26/2025    Closed fracture of transverse process of lumbar vertebra with routine healing, subsequent encounter 07/22/2023    mechanical fall    DDD (degenerative disc disease), lumbar     Degeneration of intervertebral disc of lumbar region with lower extremity pain     Dermoid cyst of scalp 01/20/2025    History of shoulder surgery     R shoulder    Hypothyroid     Lumbar degenerative disc disease 02/03/2021    Oral abscess 12/20/2023    Periapical abscess without sinus    MASSIEL (obstructive sleep apnea)     Polycythemia        Current Facility-Administered Medications:     enoxaparin (Lovenox) syringe 30 mg, 30 mg, subcutaneous, Once, Manohar Martinez MD    lactated Ringer's infusion, 20 mL/hr, intravenous, Continuous, Jomar Rhodes MD, Last Rate: 20 mL/hr  at 03/11/25 0657, 20 mL/hr at 03/11/25 0657  Prior to Admission medications    Medication Sig Start Date End Date Taking? Authorizing Provider   carisoprodol (Soma) 350 mg tablet Take 1 tablet (350 mg) by mouth 3 times a day as needed for muscle spasms. 1/20/25  Yes Antonio Richardson MD   melatonin 10 mg capsule 1 cap(s) orally once a day (at bedtime)   Yes Historical Provider, MD   levothyroxine (Synthroid, Levoxyl) 75 mcg tablet Take 1 tablet (75 mcg) by mouth once daily.  Patient not taking: Reported on 3/11/2025    Historical Provider, MD     No Known Allergies  Social History     Tobacco Use    Smoking status: Never    Smokeless tobacco: Never   Substance Use Topics    Alcohol use: Yes     Alcohol/week: 2.0 standard drinks of alcohol     Types: 2 Cans of beer per week         Chemistry    Lab Results   Component Value Date/Time     03/08/2025 0824    K 3.8 03/08/2025 0824     03/08/2025 0824    CO2 29 03/08/2025 0824    BUN 14 03/08/2025 0824    CREATININE 1.04 03/08/2025 0824    Lab Results   Component Value Date/Time    CALCIUM 9.4 03/08/2025 0824    ALKPHOS 52 03/08/2025 0824    AST 23 03/08/2025 0824    ALT 36 03/08/2025 0824    BILITOT 0.7 03/08/2025 0824          Lab Results   Component Value Date/Time    WBC 6.5 03/08/2025 0824    HGB 17.0 03/08/2025 0824    HCT 50.6 (H) 03/08/2025 0824     03/08/2025 0824           Clinical information reviewed:   Tobacco  Allergies  Meds   Med Hx  Surg Hx   Fam Hx  Soc Hx        NPO Detail:  NPO/Void Status  Date of Last Liquid: 03/10/25  Time of Last Liquid: 1130  Date of Last Solid: 03/10/25  Time of Last Solid: 2300  Last Intake Type: Clear fluids  Time of Last Void: 0600         Physical Exam    Airway  Mallampati: II  TM distance: >3 FB  Neck ROM: full     Cardiovascular   Rhythm: regular     Dental   Comments: Multiple chipped teeth   Pulmonary   Breath sounds clear to auscultation     Abdominal            Anesthesia Plan    History of  general anesthesia?: yes  History of complications of general anesthesia?: no    ASA 3     MAC     The patient is not a current smoker.    Anesthetic plan and risks discussed with patient.    Plan discussed with CRNA.

## 2025-03-11 NOTE — DISCHARGE INSTRUCTIONS
PATIENT INSTRUCTIONS  Skin mass excision    FOLLOW-UP: Please call Dr. Martinez's office at [phone] after being discharged to make a follow up appointment in 2-3 weeks. Call your physician immediately if you have any fevers greater than 103 degrees Fahrenheit, drainage from your wound that is not clear or looks infected, persistent bleeding, increasing abdominal pain,  or persistent nausea/vomiting.     WOUND CARE INSTRUCTIONS: Incisions are closed with absorbable sutures and covered with glue. Glue will fall off in about 2 weeks. If the wound becomes bright red and painful or starts to drain infected material that is not clear, please contact your physician immediately. If the wound is mildly pink and has a thick firm ridge underneath it, this is normal and is referred to as a healing ridge. This will resolve over the next 4-6 weeks. You are welcome to shower the day after surgery. Wash abdomen with warm, soapy water using your hand or gentle wash cloth. Pat dry. Do not submerge incisions in a bath tub or swimming pool for 2 weeks following surgery.    DIET: You may eat any foods that you can tolerate. We recommend following a bland, easily digestible diet for the first few days after surgery until your appetite improves. It is a good idea to eat a high fiber diet, and take in plenty of fluids to prevent constipation. Take Miralax daily if experiencing constipation after surgery until stools are a pudding like consistency and easy to pass.     ACTIVITY: You are encouraged to cough and take deep breaths or use the incentive spirometry that was provided. This will help prevent respiratory complications and low grade fevers post-operatively. You may want to hug a pillow when coughing and sneezing to add additional support to the surgical area which will decrease pain during these times. There are no specific lifting / activity restrictions, but if you feel the skin pulling, adjust your activity accordingly. We  encourage frequent ambulation and getting out of bed as much as possible while you recover to improve your recovery process.     MEDICATIONS: Plan to take Tylenol and Ibuprofen (if your physician approves) every 6 hours for the first week after surgery. Alternatively, you can alternate these two medications every three hours for the first week. You will be provided a short course of a narcotic medication to take for breakthrough pain as needed. You should not drive, make important decisions, or operate machinery when taking narcotic pain medication.    QUESTIONS: Please feel free to call Dr. Martinez's office for any questions or concerns following surgery. Our office number is [phone].

## 2025-03-19 LAB
LABORATORY COMMENT REPORT: NORMAL
PATH REPORT.FINAL DX SPEC: NORMAL
PATH REPORT.GROSS SPEC: NORMAL
PATH REPORT.RELEVANT HX SPEC: NORMAL
PATH REPORT.TOTAL CANCER: NORMAL

## 2025-04-02 ENCOUNTER — APPOINTMENT (OUTPATIENT)
Dept: SURGERY | Facility: CLINIC | Age: 40
End: 2025-04-02
Payer: COMMERCIAL

## 2025-04-15 DIAGNOSIS — E03.4 HYPOTHYROIDISM DUE TO ACQUIRED ATROPHY OF THYROID: ICD-10-CM

## 2025-04-15 RX ORDER — LEVOTHYROXINE SODIUM 75 UG/1
75 TABLET ORAL DAILY
Qty: 90 TABLET | Refills: 1 | Status: SHIPPED | OUTPATIENT
Start: 2025-04-15

## 2025-05-11 ENCOUNTER — APPOINTMENT (OUTPATIENT)
Dept: RADIOLOGY | Facility: HOSPITAL | Age: 40
End: 2025-05-11
Payer: COMMERCIAL

## 2025-05-11 ENCOUNTER — HOSPITAL ENCOUNTER (EMERGENCY)
Facility: HOSPITAL | Age: 40
Discharge: HOME | End: 2025-05-11
Attending: STUDENT IN AN ORGANIZED HEALTH CARE EDUCATION/TRAINING PROGRAM
Payer: COMMERCIAL

## 2025-05-11 VITALS
HEIGHT: 71 IN | BODY MASS INDEX: 35 KG/M2 | RESPIRATION RATE: 15 BRPM | TEMPERATURE: 98.2 F | HEART RATE: 91 BPM | WEIGHT: 250 LBS | DIASTOLIC BLOOD PRESSURE: 81 MMHG | OXYGEN SATURATION: 96 % | SYSTOLIC BLOOD PRESSURE: 140 MMHG

## 2025-05-11 DIAGNOSIS — M54.50 LOW BACK PAIN, UNSPECIFIED BACK PAIN LATERALITY, UNSPECIFIED CHRONICITY, UNSPECIFIED WHETHER SCIATICA PRESENT: ICD-10-CM

## 2025-05-11 DIAGNOSIS — T14.90XA TRAUMA: Primary | ICD-10-CM
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
## 2025-05-11 DIAGNOSIS — S33.5XXA SPRAIN OF LOW BACK, INITIAL ENCOUNTER: ICD-10-CM
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
## 2025-05-11 PROCEDURE — 72131 CT LUMBAR SPINE W/O DYE: CPT

## 2025-05-11 PROCEDURE — 72128 CT CHEST SPINE W/O DYE: CPT | Performed by: RADIOLOGY

## 2025-05-11 PROCEDURE — 72131 CT LUMBAR SPINE W/O DYE: CPT | Performed by: RADIOLOGY

## 2025-05-11 PROCEDURE — 96374 THER/PROPH/DIAG INJ IV PUSH: CPT

## 2025-05-11 PROCEDURE — 2500000004 HC RX 250 GENERAL PHARMACY W/ HCPCS (ALT 636 FOR OP/ED): Mod: JZ

## 2025-05-11 PROCEDURE — 72128 CT CHEST SPINE W/O DYE: CPT

## 2025-05-11 PROCEDURE — G0390 TRAUMA RESPONS W/HOSP CRITI: HCPCS

## 2025-05-11 PROCEDURE — 99284 EMERGENCY DEPT VISIT MOD MDM: CPT | Mod: 25

## 2025-05-11 PROCEDURE — 96375 TX/PRO/DX INJ NEW DRUG ADDON: CPT

## 2025-05-11 PROCEDURE — 2500000004 HC RX 250 GENERAL PHARMACY W/ HCPCS (ALT 636 FOR OP/ED): Performed by: STUDENT IN AN ORGANIZED HEALTH CARE EDUCATION/TRAINING PROGRAM

## 2025-05-11 PROCEDURE — 99291 CRITICAL CARE FIRST HOUR: CPT | Performed by: STUDENT IN AN ORGANIZED HEALTH CARE EDUCATION/TRAINING PROGRAM

## 2025-05-11 RX ORDER — KETOROLAC TROMETHAMINE 15 MG/ML
15 INJECTION, SOLUTION INTRAMUSCULAR; INTRAVENOUS ONCE
Status: COMPLETED | OUTPATIENT
Start: 2025-05-11 | End: 2025-05-11

## 2025-05-11 RX ORDER — CYCLOBENZAPRINE HCL 10 MG
10 TABLET ORAL 3 TIMES DAILY PRN
Qty: 30 TABLET | Refills: 0 | Status: SHIPPED | OUTPATIENT
Start: 2025-05-11

## 2025-05-11 RX ORDER — NAPROXEN 500 MG/1
500 TABLET ORAL
Qty: 20 TABLET | Refills: 0 | Status: SHIPPED | OUTPATIENT
Start: 2025-05-11

## 2025-05-11 RX ORDER — ORPHENADRINE CITRATE 30 MG/ML
60 INJECTION INTRAMUSCULAR; INTRAVENOUS ONCE
Status: COMPLETED | OUTPATIENT
Start: 2025-05-11 | End: 2025-05-11

## 2025-05-11 RX ORDER — KETOROLAC TROMETHAMINE 15 MG/ML
INJECTION, SOLUTION INTRAMUSCULAR; INTRAVENOUS
Status: COMPLETED
Start: 2025-05-11 | End: 2025-05-11

## 2025-05-11 RX ORDER — PREDNISONE 50 MG/1
50 TABLET ORAL DAILY
Qty: 5 TABLET | Refills: 0 | Status: SHIPPED | OUTPATIENT
Start: 2025-05-11 | End: 2025-05-16

## 2025-05-11 RX ORDER — LIDOCAINE 560 MG/1
1 PATCH PERCUTANEOUS; TOPICAL; TRANSDERMAL ONCE
Status: DISCONTINUED | OUTPATIENT
Start: 2025-05-11 | End: 2025-05-11 | Stop reason: HOSPADM

## 2025-05-11 RX ORDER — PREDNISONE 50 MG/1
50 TABLET ORAL ONCE
Status: COMPLETED | OUTPATIENT
Start: 2025-05-11 | End: 2025-05-11

## 2025-05-11 RX ORDER — ORPHENADRINE CITRATE 30 MG/ML
INJECTION INTRAMUSCULAR; INTRAVENOUS
Status: COMPLETED
Start: 2025-05-11 | End: 2025-05-11

## 2025-05-11 RX ORDER — LIDOCAINE 560 MG/1
1 PATCH PERCUTANEOUS; TOPICAL; TRANSDERMAL DAILY
Qty: 5 PATCH | Refills: 0 | Status: SHIPPED | OUTPATIENT
Start: 2025-05-11 | End: 2025-05-16

## 2025-05-11 RX ADMIN — PREDNISONE 50 MG: 50 TABLET ORAL at 06:06

## 2025-05-11 RX ADMIN — KETOROLAC TROMETHAMINE 15 MG: 15 INJECTION, SOLUTION INTRAMUSCULAR; INTRAVENOUS at 05:35

## 2025-05-11 RX ADMIN — ORPHENADRINE CITRATE 60 MG: 60 INJECTION INTRAMUSCULAR; INTRAVENOUS at 05:35

## 2025-05-11 RX ADMIN — ORPHENADRINE CITRATE 60 MG: 30 INJECTION INTRAMUSCULAR; INTRAVENOUS at 05:35

## 2025-05-11 ASSESSMENT — LIFESTYLE VARIABLES
HAVE PEOPLE ANNOYED YOU BY CRITICIZING YOUR DRINKING: NO
TOTAL SCORE: 0
EVER FELT BAD OR GUILTY ABOUT YOUR DRINKING: NO
EVER HAD A DRINK FIRST THING IN THE MORNING TO STEADY YOUR NERVES TO GET RID OF A HANGOVER: NO
HAVE YOU EVER FELT YOU SHOULD CUT DOWN ON YOUR DRINKING: NO

## 2025-05-11 ASSESSMENT — PAIN DESCRIPTION - DESCRIPTORS: DESCRIPTORS: SQUEEZING;THROBBING;TIGHTNESS

## 2025-05-11 ASSESSMENT — PAIN DESCRIPTION - LOCATION: LOCATION: BACK

## 2025-05-11 ASSESSMENT — PAIN DESCRIPTION - ORIENTATION: ORIENTATION: LOWER;RIGHT;LEFT

## 2025-05-11 ASSESSMENT — COLUMBIA-SUICIDE SEVERITY RATING SCALE - C-SSRS
1. IN THE PAST MONTH, HAVE YOU WISHED YOU WERE DEAD OR WISHED YOU COULD GO TO SLEEP AND NOT WAKE UP?: NO
2. HAVE YOU ACTUALLY HAD ANY THOUGHTS OF KILLING YOURSELF?: NO
6. HAVE YOU EVER DONE ANYTHING, STARTED TO DO ANYTHING, OR PREPARED TO DO ANYTHING TO END YOUR LIFE?: NO

## 2025-05-11 ASSESSMENT — PAIN DESCRIPTION - PAIN TYPE: TYPE: ACUTE PAIN

## 2025-05-11 ASSESSMENT — PAIN SCALES - GENERAL: PAINLEVEL_OUTOF10: 7

## 2025-05-11 ASSESSMENT — PAIN - FUNCTIONAL ASSESSMENT: PAIN_FUNCTIONAL_ASSESSMENT: 0-10
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
## 2025-05-11 NOTE — Clinical Note
Sha Melton was seen and treated in our emergency department on 5/11/2025.  He may return to work on 05/14/2025.       If you have any questions or concerns, please don't hesitate to call.      Jessica Magaña RN
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
## (undated) DEVICE — PREP TRAY, SKIN, DRY, W/GLOVES

## (undated) DEVICE — SUTURE, VICRYL, 4-0, 18 IN, PS2, UNDYED

## (undated) DEVICE — SUTURE, VICRYL, 3-0, 27 IN, CT-2, UNDYED

## (undated) DEVICE — DRAPE, SHEET, THYROID, W/ARMBOARD COVER, 100 X 121 IN, DISPOSABLE, LF, STERILE

## (undated) DEVICE — ELECTRODE, ELECTROSURGICAL, BLADE, INSULATED, ENT/IMA, STERILE

## (undated) DEVICE — DRESSING, GAUZE, SPONGE, 12 PLY, CURITY, 4 X 4 IN, STERILE

## (undated) DEVICE — SUTURE, VICRYL, 3-0, 27IN, RB-1

## (undated) DEVICE — ADHESIVE, SKIN, DERMABOND ADVANCED, 15CM, PEN-STYLE

## (undated) DEVICE — DRAPE PACK, MINOR, CUSTOM, GEAUGA

## (undated) DEVICE — CAUTERY, PENCIL, PUSH BUTTON, SMOKE EVAC, 70MM

## (undated) DEVICE — DRESSING, NON-ADHERENT, TELFA, OUCHLESS, 3 X 8 IN, STERILE